# Patient Record
Sex: FEMALE | Race: WHITE
[De-identification: names, ages, dates, MRNs, and addresses within clinical notes are randomized per-mention and may not be internally consistent; named-entity substitution may affect disease eponyms.]

---

## 2020-01-29 ENCOUNTER — HOSPITAL ENCOUNTER (INPATIENT)
Dept: HOSPITAL 15 - ER | Age: 40
LOS: 2 days | Discharge: HOME | DRG: 340 | End: 2020-01-31
Attending: FAMILY MEDICINE | Admitting: NURSE PRACTITIONER
Payer: COMMERCIAL

## 2020-01-29 VITALS — BODY MASS INDEX: 30.55 KG/M2 | WEIGHT: 194.67 LBS | HEIGHT: 67 IN

## 2020-01-29 VITALS — SYSTOLIC BLOOD PRESSURE: 133 MMHG | DIASTOLIC BLOOD PRESSURE: 76 MMHG

## 2020-01-29 DIAGNOSIS — K35.33: Primary | ICD-10-CM

## 2020-01-29 DIAGNOSIS — N73.9: ICD-10-CM

## 2020-01-29 DIAGNOSIS — Z79.899: ICD-10-CM

## 2020-01-29 LAB
ALBUMIN SERPL-MCNC: 4.5 G/DL (ref 3.4–5)
ALP SERPL-CCNC: 52 U/L (ref 45–117)
ALT SERPL-CCNC: 27 U/L (ref 13–56)
ANION GAP SERPL CALCULATED.3IONS-SCNC: 6 MMOL/L (ref 5–15)
BILIRUB SERPL-MCNC: 0.4 MG/DL (ref 0.2–1)
BUN SERPL-MCNC: 11 MG/DL (ref 7–18)
BUN/CREAT SERPL: 14.9
CALCIUM SERPL-MCNC: 9.3 MG/DL (ref 8.5–10.1)
CHLORIDE SERPL-SCNC: 102 MMOL/L (ref 98–107)
CO2 SERPL-SCNC: 27 MMOL/L (ref 21–32)
GLUCOSE SERPL-MCNC: 95 MG/DL (ref 74–106)
HCT VFR BLD AUTO: 43 % (ref 36–46)
HGB BLD-MCNC: 14.5 G/DL (ref 12.2–16.2)
MCH RBC QN AUTO: 30.8 PG (ref 28–32)
MCV RBC AUTO: 91.4 FL (ref 80–100)
NRBC BLD QL AUTO: 0 %
POTASSIUM SERPL-SCNC: 3.8 MMOL/L (ref 3.5–5.1)
PROT SERPL-MCNC: 8.3 G/DL (ref 6.4–8.2)
SODIUM SERPL-SCNC: 135 MMOL/L (ref 136–145)

## 2020-01-29 PROCEDURE — 85730 THROMBOPLASTIN TIME PARTIAL: CPT

## 2020-01-29 PROCEDURE — 96375 TX/PRO/DX INJ NEW DRUG ADDON: CPT

## 2020-01-29 PROCEDURE — 86901 BLOOD TYPING SEROLOGIC RH(D): CPT

## 2020-01-29 PROCEDURE — 96365 THER/PROPH/DIAG IV INF INIT: CPT

## 2020-01-29 PROCEDURE — 86850 RBC ANTIBODY SCREEN: CPT

## 2020-01-29 PROCEDURE — 81001 URINALYSIS AUTO W/SCOPE: CPT

## 2020-01-29 PROCEDURE — 71045 X-RAY EXAM CHEST 1 VIEW: CPT

## 2020-01-29 PROCEDURE — 84702 CHORIONIC GONADOTROPIN TEST: CPT

## 2020-01-29 PROCEDURE — 96367 TX/PROPH/DG ADDL SEQ IV INF: CPT

## 2020-01-29 PROCEDURE — 74176 CT ABD & PELVIS W/O CONTRAST: CPT

## 2020-01-29 PROCEDURE — 81025 URINE PREGNANCY TEST: CPT

## 2020-01-29 PROCEDURE — 85610 PROTHROMBIN TIME: CPT

## 2020-01-29 PROCEDURE — 36415 COLL VENOUS BLD VENIPUNCTURE: CPT

## 2020-01-29 PROCEDURE — 80053 COMPREHEN METABOLIC PANEL: CPT

## 2020-01-29 PROCEDURE — 80048 BASIC METABOLIC PNL TOTAL CA: CPT

## 2020-01-29 PROCEDURE — 85025 COMPLETE CBC W/AUTO DIFF WBC: CPT

## 2020-01-29 PROCEDURE — 86900 BLOOD TYPING SEROLOGIC ABO: CPT

## 2020-01-29 NOTE — NUR
[facility] admit from ER

LUCÍA,GLENROY admitted to [facility].  Patient oriented to Janessa lazaro RN, unit, room, bed, 
and unit policies regarding patient care and visiting hours. Patient weighed by bedscale and 
encouraged to call if they need something. All questions and concerns addressed, patient 
verbalized understanding.

## 2020-01-30 VITALS — DIASTOLIC BLOOD PRESSURE: 80 MMHG | SYSTOLIC BLOOD PRESSURE: 121 MMHG

## 2020-01-30 VITALS — SYSTOLIC BLOOD PRESSURE: 98 MMHG | DIASTOLIC BLOOD PRESSURE: 65 MMHG

## 2020-01-30 VITALS — SYSTOLIC BLOOD PRESSURE: 99 MMHG | DIASTOLIC BLOOD PRESSURE: 60 MMHG

## 2020-01-30 VITALS — SYSTOLIC BLOOD PRESSURE: 113 MMHG | DIASTOLIC BLOOD PRESSURE: 63 MMHG

## 2020-01-30 VITALS — DIASTOLIC BLOOD PRESSURE: 72 MMHG | SYSTOLIC BLOOD PRESSURE: 120 MMHG

## 2020-01-30 LAB
ANION GAP SERPL CALCULATED.3IONS-SCNC: 4 MMOL/L (ref 5–15)
APTT PPP: 31.1 SEC (ref 23.64–32.05)
BUN SERPL-MCNC: 7 MG/DL (ref 7–18)
BUN/CREAT SERPL: 11.3
CALCIUM SERPL-MCNC: 8.3 MG/DL (ref 8.5–10.1)
CHLORIDE SERPL-SCNC: 108 MMOL/L (ref 98–107)
CO2 SERPL-SCNC: 27 MMOL/L (ref 21–32)
GLUCOSE SERPL-MCNC: 93 MG/DL (ref 74–106)
HCT VFR BLD AUTO: 37.4 % (ref 36–46)
HGB BLD-MCNC: 12.9 G/DL (ref 12.2–16.2)
INR PPP: 1.07 (ref 0.9–1.15)
MCH RBC QN AUTO: 31.4 PG (ref 28–32)
MCV RBC AUTO: 91.1 FL (ref 80–100)
NRBC BLD QL AUTO: 0 %
POTASSIUM SERPL-SCNC: 3.8 MMOL/L (ref 3.5–5.1)
SODIUM SERPL-SCNC: 139 MMOL/L (ref 136–145)

## 2020-01-30 PROCEDURE — 0W9J4ZZ DRAINAGE OF PELVIC CAVITY, PERCUTANEOUS ENDOSCOPIC APPROACH: ICD-10-PCS | Performed by: SURGERY

## 2020-01-30 PROCEDURE — 0DTJ4ZZ RESECTION OF APPENDIX, PERCUTANEOUS ENDOSCOPIC APPROACH: ICD-10-PCS | Performed by: SURGERY

## 2020-01-30 RX ADMIN — SODIUM CHLORIDE SCH MLS/HR: 0.9 INJECTION, SOLUTION INTRAVENOUS at 20:34

## 2020-01-30 RX ADMIN — ONDANSETRON HYDROCHLORIDE PRN MG: 2 INJECTION, SOLUTION INTRAMUSCULAR; INTRAVENOUS at 04:49

## 2020-01-30 RX ADMIN — MORPHINE SULFATE PRN MG: 2 INJECTION, SOLUTION INTRAMUSCULAR; INTRAVENOUS at 04:50

## 2020-01-30 RX ADMIN — SODIUM CHLORIDE SCH MLS/HR: 0.9 INJECTION, SOLUTION INTRAVENOUS at 08:37

## 2020-01-30 RX ADMIN — CEFTRIAXONE SODIUM SCH MLS/HR: 1 INJECTION, POWDER, FOR SOLUTION INTRAMUSCULAR; INTRAVENOUS at 08:46

## 2020-01-30 RX ADMIN — MORPHINE SULFATE PRN MG: 2 INJECTION, SOLUTION INTRAMUSCULAR; INTRAVENOUS at 23:20

## 2020-01-30 RX ADMIN — ACETAMINOPHEN PRN MG: 325 TABLET ORAL at 08:56

## 2020-01-30 RX ADMIN — MORPHINE SULFATE PRN MG: 2 INJECTION, SOLUTION INTRAMUSCULAR; INTRAVENOUS at 17:15

## 2020-01-30 RX ADMIN — ONDANSETRON HYDROCHLORIDE PRN MG: 2 INJECTION, SOLUTION INTRAMUSCULAR; INTRAVENOUS at 23:20

## 2020-01-30 RX ADMIN — PANTOPRAZOLE SODIUM SCH MG: 40 INJECTION, POWDER, FOR SOLUTION INTRAVENOUS at 08:57

## 2020-01-30 RX ADMIN — SODIUM CHLORIDE SCH MLS/HR: 0.9 INJECTION, SOLUTION INTRAVENOUS at 00:19

## 2020-01-30 NOTE — NUR
Closing Shift Note

Patient resting in bed. No distress noted. Report given. Will endorse care to the night 
shift RN.

## 2020-01-30 NOTE — NUR
OPENING NOTE

REPORT RECEIVED FROM DAY SHIFT RN

PATIENT IS A/OX4 RESTING IN BED. 3 ABDOMINAL INCISIONS NOTED OPEN TO AIR. ABD BINDER IN 
PLACE. SCD'S ON BILATERALLY. INCENTIVE SPIROMETER PROVIDED TO PATIENT WITH EDUCATION AND 
RETURN DEMONSTRATION. PATIENT ABLE TO DEMONSTRATE APPROPRIATE USE. IV FLUIDS RUNNING AS 
ORDERED. POC DISCUSSED, ALL QUESTIONS ANSWERED. WILL MONITOR Q1H PRN THROUGHOUT SHIFT. CALL 
LIGHT WITHIN REACH.

## 2020-01-30 NOTE — NUR
Patient back on unit

Patient back on unit. Patient appears to have tolerated procedure well. Patient has 
abdominal binder on. Patient has 3 incisions, open to air that are clean and dry.

## 2020-01-30 NOTE — NUR
CLOSING

PATIENT RESTING IN BED, BOYFRIEND IS AT BEDSIDE.

IV FLUIDS RUNNING AS ORDERED. PENDING SURGICAL CONSULT. PATIENT NPO SINCE MIDNIGHT. CALL 
LIGHT WITHIN REACH.

## 2020-01-30 NOTE — NUR
Call to Mechelle

Call to Mechelle in the OR at this time. Informed Mechelle of doctor's orders to perform surgery 
today. Mechelle to call Dr. Hawley and notify him of OR schedule. Mechelle to call this RN back.

## 2020-01-30 NOTE — NUR
AMBULATION

PATIENT DID TWO LAPS AROUND THE NURSES STATION

PATIENT TOLERATED WELL. PT BACK IN BED WITH SCD'S ON BILATERALLY

## 2020-01-30 NOTE — NUR
Opening Shift Note

Assuming care of patient at this time. Patient is awake and alert. Patient is having pain to 
the right lower quadrant. Patient shows no signs or symptoms of shortness of breath. 
Instructed patient on the plan of care for today and to call for assistance as needed. Call 
light within reach. Will continue to round hourly and as needed.

## 2020-01-30 NOTE — NUR
Tolerating Clear Liquids

Patient has been tolerating clear liquids, ice, water, and broth. No distress noted.

## 2020-01-31 VITALS — SYSTOLIC BLOOD PRESSURE: 130 MMHG | DIASTOLIC BLOOD PRESSURE: 84 MMHG

## 2020-01-31 VITALS — DIASTOLIC BLOOD PRESSURE: 73 MMHG | SYSTOLIC BLOOD PRESSURE: 124 MMHG

## 2020-01-31 VITALS — DIASTOLIC BLOOD PRESSURE: 73 MMHG | SYSTOLIC BLOOD PRESSURE: 121 MMHG

## 2020-01-31 LAB
ALBUMIN SERPL-MCNC: 3.4 G/DL (ref 3.4–5)
ALP SERPL-CCNC: 36 U/L (ref 45–117)
ALT SERPL-CCNC: 17 U/L (ref 13–56)
ANION GAP SERPL CALCULATED.3IONS-SCNC: 3 MMOL/L (ref 5–15)
BILIRUB SERPL-MCNC: 0.5 MG/DL (ref 0.2–1)
BUN SERPL-MCNC: 6 MG/DL (ref 7–18)
BUN/CREAT SERPL: 8.5
CALCIUM SERPL-MCNC: 8.5 MG/DL (ref 8.5–10.1)
CHLORIDE SERPL-SCNC: 110 MMOL/L (ref 98–107)
CO2 SERPL-SCNC: 27 MMOL/L (ref 21–32)
GLUCOSE SERPL-MCNC: 124 MG/DL (ref 74–106)
HCT VFR BLD AUTO: 37.6 % (ref 36–46)
HGB BLD-MCNC: 12.4 G/DL (ref 12.2–16.2)
MCH RBC QN AUTO: 31.5 PG (ref 28–32)
MCV RBC AUTO: 95.3 FL (ref 80–100)
NRBC BLD QL AUTO: 0 %
POTASSIUM SERPL-SCNC: 4.4 MMOL/L (ref 3.5–5.1)
PROT SERPL-MCNC: 6.6 G/DL (ref 6.4–8.2)
SODIUM SERPL-SCNC: 140 MMOL/L (ref 136–145)

## 2020-01-31 RX ADMIN — SODIUM CHLORIDE SCH MLS/HR: 0.9 INJECTION, SOLUTION INTRAVENOUS at 09:11

## 2020-01-31 RX ADMIN — MORPHINE SULFATE PRN MG: 2 INJECTION, SOLUTION INTRAMUSCULAR; INTRAVENOUS at 09:12

## 2020-01-31 RX ADMIN — CEFTRIAXONE SODIUM SCH MLS/HR: 1 INJECTION, POWDER, FOR SOLUTION INTRAMUSCULAR; INTRAVENOUS at 09:11

## 2020-01-31 RX ADMIN — ACETAMINOPHEN PRN MG: 325 TABLET ORAL at 04:38

## 2020-01-31 RX ADMIN — PANTOPRAZOLE SODIUM SCH MG: 40 INJECTION, POWDER, FOR SOLUTION INTRAVENOUS at 09:10

## 2020-01-31 RX ADMIN — SODIUM CHLORIDE SCH MLS/HR: 0.9 INJECTION, SOLUTION INTRAVENOUS at 06:30

## 2020-01-31 NOTE — NUR
Opening Shift Note

Assuming care of patient at this time. Patient is awake and alert. Patient is currently 
wearing abdominal binder, 3 incisions are clean, dry, and open to air. Patient shows no 
signs or symptoms of shortness of breath. Instructed patient on the plan of care for today 
and to call for assistance as needed. Call light within reach. Will continue to round hourly 
and as needed.

## 2020-01-31 NOTE — NUR
at bedside

Dr. Velásquez at bedside discussing plan of care with patient and this RN. Dr. Velásquez instructed 
patient, after speaking with Dr. Hawley, that patient will be able to be discharged. 
Instructed patient that per Dr. Hawley, she should not fly for at least one week. Patient 
verbalized understanding, however, already has a scheduled flight for Sunday that she will 
try to postpone until Monday or Tuesday. Patient was informed of the risks, however states, 
"I have two small kids that I need to get home to. I have to do what I have to do. I 
understand that there are risks."

## 2020-01-31 NOTE — NUR
CLOSING

PATIENT RESTING COMFORTABLY IN BED. NO S/S OF DISTRESS. INCISIONS TO ABDOMEN STILL OPEN TO 
AIR, NO DRAINAGE NOTED. ABD BINDER IN PLACE, SCD'S ON BILATERALLY. 

WILL ENDORSE CARE TO DAY SHIFT RN

## 2020-01-31 NOTE — NUR
Discharge 

Discharge instructions given as ordered. Encourage to follow up with PMD and surgeon as 
instructed when returning to home state. All questions and concerns addressed. Patient 
verbalized understanding.  Medication reconciliation form completed and copy given to 
patient. IV removed with catheter intact, pressure dressing applied.  Patient taken with all 
personal belongings, accompanied by boyfriend. No distress noted at time of departure.